# Patient Record
(demographics unavailable — no encounter records)

---

## 2024-12-18 NOTE — PHYSICAL EXAM
[4___] : hamstring 4[unfilled]/5 [Negative] : negative Irma's [Right] : right knee [AP] : anteroposterior [Lateral] : lateral [[place]] : skyline [Moderate tricompartmental OA medial narrowing] : Moderate tricompartmental OA medial narrowing [Moderate patellofemoral OA] : Moderate patellofemoral OA [] : no pes anserinus tenderness [FreeTextEntry3] : healing anterior shin abrasion [[id number]] : flexion 110 degrees [de-identified] : extension 10 degrees

## 2024-12-18 NOTE — HISTORY OF PRESENT ILLNESS
[de-identified] : Patient is following up on RT knee pain. Patient states that she is only having periodic pain in the knee but is mostly doing good. Patient wants to know if she would benefit from a cortisone injection.

## 2024-12-18 NOTE — DISCUSSION/SUMMARY
[de-identified] : I reviewed patient's prior knee radiographs and discussed her condition and treatment options.  Defer further injections given continued relief from the last one.  Discussed local wound care and will follow up with PCP as scheduled this week.  Follow up as needed.  Patient voiced understanding and agreement with the plan.

## 2025-04-22 NOTE — PROCEDURE
[Large Joint Injection] : Large joint injection [Left] : of the left [Greater Trochanteric Bursa] : greater trochanteric bursa [Pain] : pain [Inflammation] : inflammation [X-ray evidence of Osteoarthritis on this or prior visit] : x-ray evidence of Osteoarthritis on this or prior visit [Alcohol] : alcohol [Betadine] : betadine [Ethyl Chloride sprayed topically] : ethyl chloride sprayed topically [___ cc    3mg] :  Betamethasone (Celestone) ~Vcc of 3mg [___ cc    1%] : Lidocaine ~Vcc of 1%  [] : Patient tolerated procedure well [Call if redness, pain or fever occur] : call if redness, pain or fever occur [Apply ice for 15min out of every hour for the next 12-24 hours as tolerated] : apply ice for 15 minutes out of every hour for the next 12-24 hours as tolerated [Patient was advised to rest the joint(s) for ____ days] : patient was advised to rest the joint(s) for [unfilled] days [Previous OTC use and PT nontherapeutic] : patient has tried OTC's including aspirin, Ibuprofen, Aleve, etc or prescription NSAIDS, and/or exercises at home and/or physical therapy without satisfactory response [Patient had decreased mobility in the joint] : patient had decreased mobility in the joint [Risks, benefits, alternatives discussed / Verbal consent obtained] : the risks benefits, and alternatives have been discussed, and verbal consent was obtained [Sterile technique used] : sterile technique used

## 2025-04-22 NOTE — HISTORY OF PRESENT ILLNESS
[de-identified] :  Patient presents for evaluation on LT hip pain. States that she did a lot of stairs a couple of days ago and has been having some pain in hips, left worse than right. States that knee is feeling slightly better but also more aggravated by more activity.

## 2025-04-22 NOTE — DISCUSSION/SUMMARY
[de-identified] : I reviewed patient's radiographs and discussed her condition and treatment options. She tolerated injection well.  Follow up on 4/25 to address the right knee and possible injection.  Patient voiced understanding and agreement with the plan.

## 2025-04-22 NOTE — PHYSICAL EXAM
[NL (120)] : flexion 120 degrees [5___] : flexion 5[unfilled]/5 [2+] : posterior tibialis pulse: 2+ [] : patient ambulates without assistive device [Left] : left hip with pelvis [AP] : anteroposterior [Lateral] : lateral [There are no fractures, subluxations or dislocations. No significant abnormalities are seen] : There are no fractures, subluxations or dislocations. No significant abnormalities are seen

## 2025-05-27 NOTE — PROCEDURE
[Large Joint Injection] : Large joint injection [Right] : of the right [Knee] : knee [Pain] : pain [Inflammation] : inflammation [Alcohol] : alcohol [Betadine] : betadine [Ethyl Chloride sprayed topically] : ethyl chloride sprayed topically [Sterile technique used] : sterile technique used [___ cc    6mg] :  Betamethasone (Celestone) ~Vcc of 6mg [___ cc    0.25%] : Bupivacaine (Marcaine) ~Vcc of 0.25%  [] : Patient tolerated procedure well [Call if redness, pain or fever occur] : call if redness, pain or fever occur [Apply ice for 15min out of every hour for the next 12-24 hours as tolerated] : apply ice for 15 minutes out of every hour for the next 12-24 hours as tolerated [Patient was advised to rest the joint(s) for ____ days] : patient was advised to rest the joint(s) for [unfilled] days [Previous OTC use and PT nontherapeutic] : patient has tried OTC's including aspirin, Ibuprofen, Aleve, etc or prescription NSAIDS, and/or exercises at home and/or physical therapy without satisfactory response [Patient had decreased mobility in the joint] : patient had decreased mobility in the joint [Risks, benefits, alternatives discussed / Verbal consent obtained] : the risks benefits, and alternatives have been discussed, and verbal consent was obtained [X-ray evidence of Osteoarthritis on this or prior visit] : x-ray evidence of Osteoarthritis on this or prior visit

## 2025-06-04 NOTE — HISTORY OF PRESENT ILLNESS
[de-identified] : Patient is following up on LT hip pain. Patient had cortisone injection last visit and states that she had some relief. Patient states she still has some pain but not as severe.  Wondering about next steps.

## 2025-06-04 NOTE — PHYSICAL EXAM
[NL (120)] : flexion 120 degrees [5___] : flexion 5[unfilled]/5 [2+] : posterior tibialis pulse: 2+ [Left] : left hip with pelvis [AP] : anteroposterior [Lateral] : lateral [There are no fractures, subluxations or dislocations. No significant abnormalities are seen] : There are no fractures, subluxations or dislocations. No significant abnormalities are seen [] : no erythema

## 2025-06-04 NOTE — DISCUSSION/SUMMARY
[de-identified] : I reviewed patient's prior radiographs and discussed her condition and treatment options.  Defer further injections or imaging.  Advised return to walking program.  Follow up as needed.  Patient voiced understanding and agreement with the plan.